# Patient Record
Sex: FEMALE | Race: WHITE | Employment: PART TIME | URBAN - METROPOLITAN AREA
[De-identification: names, ages, dates, MRNs, and addresses within clinical notes are randomized per-mention and may not be internally consistent; named-entity substitution may affect disease eponyms.]

---

## 2020-07-03 ENCOUNTER — HOSPITAL ENCOUNTER (EMERGENCY)
Facility: CLINIC | Age: 55
Discharge: HOME | End: 2020-07-03
Attending: PHYSICIAN ASSISTANT | Admitting: PHYSICIAN ASSISTANT
Payer: COMMERCIAL

## 2020-07-03 ENCOUNTER — APPOINTMENT (OUTPATIENT)
Dept: GENERAL RADIOLOGY | Facility: CLINIC | Age: 55
End: 2020-07-03
Attending: PHYSICIAN ASSISTANT
Payer: COMMERCIAL

## 2020-07-03 VITALS
OXYGEN SATURATION: 100 % | SYSTOLIC BLOOD PRESSURE: 145 MMHG | DIASTOLIC BLOOD PRESSURE: 89 MMHG | WEIGHT: 145 LBS | HEART RATE: 66 BPM | RESPIRATION RATE: 16 BRPM | TEMPERATURE: 98 F

## 2020-07-03 DIAGNOSIS — R07.9 CHEST PAIN: ICD-10-CM

## 2020-07-03 LAB
ANION GAP SERPL CALCULATED.3IONS-SCNC: 4 MMOL/L (ref 3–14)
BASOPHILS # BLD AUTO: 0.1 10E9/L (ref 0–0.2)
BASOPHILS NFR BLD AUTO: 1.1 %
BUN SERPL-MCNC: 13 MG/DL (ref 7–30)
CALCIUM SERPL-MCNC: 8.8 MG/DL (ref 8.5–10.1)
CHLORIDE SERPL-SCNC: 104 MMOL/L (ref 94–109)
CO2 SERPL-SCNC: 28 MMOL/L (ref 20–32)
CREAT SERPL-MCNC: 0.7 MG/DL (ref 0.52–1.04)
D DIMER PPP FEU-MCNC: <0.3 UG/ML FEU (ref 0–0.5)
DIFFERENTIAL METHOD BLD: NORMAL
EOSINOPHIL # BLD AUTO: 0.3 10E9/L (ref 0–0.7)
EOSINOPHIL NFR BLD AUTO: 5 %
ERYTHROCYTE [DISTWIDTH] IN BLOOD BY AUTOMATED COUNT: 12.5 % (ref 10–15)
GFR SERPL CREATININE-BSD FRML MDRD: >90 ML/MIN/{1.73_M2}
GLUCOSE SERPL-MCNC: 92 MG/DL (ref 70–99)
HCT VFR BLD AUTO: 42.2 % (ref 35–47)
HGB BLD-MCNC: 13.7 G/DL (ref 11.7–15.7)
IMM GRANULOCYTES # BLD: 0 10E9/L (ref 0–0.4)
IMM GRANULOCYTES NFR BLD: 0.2 %
LYMPHOCYTES # BLD AUTO: 1.3 10E9/L (ref 0.8–5.3)
LYMPHOCYTES NFR BLD AUTO: 25 %
MCH RBC QN AUTO: 30.4 PG (ref 26.5–33)
MCHC RBC AUTO-ENTMCNC: 32.5 G/DL (ref 31.5–36.5)
MCV RBC AUTO: 94 FL (ref 78–100)
MONOCYTES # BLD AUTO: 0.5 10E9/L (ref 0–1.3)
MONOCYTES NFR BLD AUTO: 9.5 %
NEUTROPHILS # BLD AUTO: 3.1 10E9/L (ref 1.6–8.3)
NEUTROPHILS NFR BLD AUTO: 59.2 %
NRBC # BLD AUTO: 0 10*3/UL
NRBC BLD AUTO-RTO: 0 /100
PLATELET # BLD AUTO: 311 10E9/L (ref 150–450)
POTASSIUM SERPL-SCNC: 4.1 MMOL/L (ref 3.4–5.3)
RBC # BLD AUTO: 4.51 10E12/L (ref 3.8–5.2)
SODIUM SERPL-SCNC: 136 MMOL/L (ref 133–144)
TROPONIN I SERPL-MCNC: <0.015 UG/L (ref 0–0.04)
TROPONIN I SERPL-MCNC: <0.015 UG/L (ref 0–0.04)
WBC # BLD AUTO: 5.2 10E9/L (ref 4–11)

## 2020-07-03 PROCEDURE — 84484 ASSAY OF TROPONIN QUANT: CPT | Performed by: PHYSICIAN ASSISTANT

## 2020-07-03 PROCEDURE — 85379 FIBRIN DEGRADATION QUANT: CPT | Performed by: PHYSICIAN ASSISTANT

## 2020-07-03 PROCEDURE — 99285 EMERGENCY DEPT VISIT HI MDM: CPT | Mod: 25

## 2020-07-03 PROCEDURE — 93005 ELECTROCARDIOGRAM TRACING: CPT

## 2020-07-03 PROCEDURE — 71046 X-RAY EXAM CHEST 2 VIEWS: CPT

## 2020-07-03 PROCEDURE — 80048 BASIC METABOLIC PNL TOTAL CA: CPT | Performed by: PHYSICIAN ASSISTANT

## 2020-07-03 PROCEDURE — 85025 COMPLETE CBC W/AUTO DIFF WBC: CPT | Performed by: PHYSICIAN ASSISTANT

## 2020-07-03 ASSESSMENT — ENCOUNTER SYMPTOMS
DIZZINESS: 1
WHEEZING: 0
LIGHT-HEADEDNESS: 1
FEVER: 0
NAUSEA: 1
COUGH: 0
CHILLS: 1
VOMITING: 0

## 2020-07-03 NOTE — ED TRIAGE NOTES
Chest pain with some lightheadedness for 1.5 hours.  ABCs intact.  Patient is alert and oriented x3.

## 2020-07-03 NOTE — ED PROVIDER NOTES
History     Chief Complaint:  Chest Pain    HPI   Trena Yi is a 54 year old female who presents with centralized chest pain. She reports that her pain began approximately 1.5 hours prior to arrival. She does not report any abnormal activity prior to the onset of her chest pain and pressure. She indicates that her pain is constant but she has episodes of intense pain which last for 1 minute before subsiding. She rates her pain as a 2/10, which is not changed by position or exertion. During her episodes, she reports feeling dizzy, lightheaded, and has mild nausea and chills. She denies vomiting, cough, fever, hemoptysis, and wheezing.  She has no abdominal pain or difficulty breathing. She denies leg swelling or a feeling of fluid on her legs. Of note, she reports no personal history of heart disease, although her mother had a MI. She denies any other complaints.    Allergies:  No known drug allergies    Medications:    Esterified estrogens     Past Medical History:    Unspecified hearing loss    Past Surgical History:    C section  Cyst removal    Family History:    History reviewed. No pertinent family history.     Social History:  Smoking status: never  Alcohol use: yes, 4 times a week  Marital Status:   [2]     Review of Systems   Constitutional: Positive for chills. Negative for fever.   Respiratory: Negative for cough and wheezing.    Cardiovascular: Positive for chest pain. Negative for leg swelling.   Gastrointestinal: Positive for nausea. Negative for vomiting.   Neurological: Positive for dizziness and light-headedness.   All other systems reviewed and are negative.      Physical Exam     Patient Vitals for the past 24 hrs:   BP Temp Pulse Heart Rate Resp SpO2 Weight   07/03/20 1500 -- -- -- -- -- 100 % --   07/03/20 1445 (!) 145/89 -- -- -- -- -- --   07/03/20 1315 128/86 -- 66 64 -- 99 % --   07/03/20 1300 132/83 -- 62 60 -- 100 % --   07/03/20 1245 133/87 -- 62 70 -- 92 % --   07/03/20 1230  138/86 -- 66 66 -- 100 % --   07/03/20 1215 (!) 148/89 -- -- -- -- -- --   07/03/20 1203 (!) 152/89 98  F (36.7  C) 58 -- 16 98 % 65.8 kg (145 lb)     Physical Exam  Vitals signs and nursing note reviewed.   Constitutional:       General: She is not in acute distress.     Appearance: She is not diaphoretic.   HENT:      Head: Normocephalic and atraumatic.      Mouth/Throat:      Pharynx: No oropharyngeal exudate.   Eyes:      General: No scleral icterus.     Extraocular Movements: Extraocular movements intact.   Cardiovascular:      Rate and Rhythm: Normal rate and regular rhythm.      Pulses: Normal pulses.      Heart sounds: Normal heart sounds.   Pulmonary:      Effort: Pulmonary effort is normal. No respiratory distress.      Breath sounds: Normal breath sounds.   Abdominal:      Palpations: Abdomen is soft.      Tenderness: There is no abdominal tenderness.   Musculoskeletal:         General: No tenderness.   Skin:     General: Skin is warm.      Findings: No rash.   Neurological:      Mental Status: She is alert.       Emergency Department Course   ECG:  ECG taken at 1217, ECG read at 1221  Normal sinus rhythm  Normal ECG  Rate 65 bpm. TN interval 136 ms. QRS duration 94 ms. QT/QTc 402/418 ms. P-R-T axes 80 78 50.    Imaging:  Radiographic findings were communicated with the patient who voiced understanding of the findings.  XR Chest PA & LAT  IMPRESSION: No radiographic evidence of acute chest abnormality  Reading per radiology    Laboratory:  Laboratory findings were communicated with the patient who voiced understanding of the findings.    CBC: WNL. (WBC 5.2, HGB 13.7, )   BMP: Glucose 92, o/w WNL (Creatinine: 0.70)    D-dimer: <0.3  Troponin (Collected 1237): <0.015  Troponin (Collected 1448): <0.015    Emergency Department Course:  Past medical records, nursing notes, and vitals reviewed.  1209: I performed an exam of the patient and obtained history, as documented above.     Blood drawn. This was  sent to the lab for further testing, results above.    An ECG was obtained in the emergency department, findings above.     The patient was sent for a chest XR while in the emergency department, findings above.    1334: I rechecked the patient. Explained findings to patient.    1422: I rechecked the patient. Findings and plan explained to the Patient. Patient discharged home with instructions regarding supportive care, medications, and reasons to return. The importance of close follow-up was reviewed.       Impression & Plan      Medical Decision Making:  She presents for evaluation of chest pain and near syncope. Here she appears well and hemodynamically stable. Her EKG demonstrates no ischemic changes or dysrhythmia at this time. Given the timing of her symptoms serial troponins appear warranted to adequately evaluate for NSTEMI. These were without elevation ruling this down. She is HEART score low risk and appears candidate for outpatient PCP follow up. CAD/ACS is clinically unlikely at this time.   She is presently on estrogen therapy prompting D-dimer to risk stratify for pulmonary embolism. This was without elevation and CT imaging does not appear warranted for further evaluation at this time.   Her CXR demonstrates no findings of pneumothorax, widened mediastinum and clinically not most c/w aortic dissection . She has no muffled heart sounds, no cardiomegaly on imaging, and unlikely to be cardiac tamponade.   She appears a candidate for outpatient care.       Diagnosis:    ICD-10-CM    1. Chest pain  R07.9 Troponin I       Disposition:  discharged to home    Discharge Medications:  New Prescriptions    No medications on file     Scribe Disclosure:  I, Adelaide Ramirez, am serving as a scribe at 12:08 PM on 7/3/2020 to document services personally performed by Nish Cody based on my observations and the provider's statements to me.     Adelaide Ramirez  7/3/2020   [facility]  EMERGENCY DEPARTMENT       Nish Cody PA-C  07/03/20 1960

## 2020-07-03 NOTE — ED AVS SNAPSHOT
[facility] Emergency Department  [address]  [facility] [address]  Phone:  [phone]  Fax:  [phone]                                    Trena Yi   MRN: [id number]    Department:  [facility] Emergency Department   Date of Visit:  7/3/2020           After Visit Summary Signature Page    I have received my discharge instructions, and my questions have been answered. I have discussed any challenges I see with this plan with the nurse or doctor.    ..........................................................................................................................................  Patient/Patient Representative Signature      ..........................................................................................................................................  Patient Representative Print Name and Relationship to Patient    ..................................................               ................................................  Date                                   Time    ..........................................................................................................................................  Reviewed by Signature/Title    ...................................................              ..............................................  Date                                               Time          22EPIC Rev 08/18

## 2020-07-04 LAB — INTERPRETATION ECG - MUSE: NORMAL

## 2020-12-06 ENCOUNTER — HEALTH MAINTENANCE LETTER (OUTPATIENT)
Age: 55
End: 2020-12-06

## 2021-09-25 ENCOUNTER — HEALTH MAINTENANCE LETTER (OUTPATIENT)
Age: 56
End: 2021-09-25

## 2022-01-15 ENCOUNTER — HEALTH MAINTENANCE LETTER (OUTPATIENT)
Age: 57
End: 2022-01-15

## 2022-08-26 ENCOUNTER — OFFICE VISIT (OUTPATIENT)
Dept: PLASTIC SURGERY | Facility: CLINIC | Age: 57
End: 2022-08-26

## 2022-08-26 DIAGNOSIS — Z41.1 ELECTIVE PROCEDURE FOR UNACCEPTABLE COSMETIC APPEARANCE: Primary | ICD-10-CM

## 2022-08-26 NOTE — LETTER
8/26/2022       RE: Trena Yi  [address]  [facility] [address]     Dear Colleague,    Thank you for referring your patient, Trena Yi, to the [facility]. Please see a copy of my visit note below.    Facial Plastic and Reconstructive Surgery Consultation    Referring Provider:   Referred Self, MD  No address on file    HPI:   I had the pleasure of seeing Trena Yi today in clinic for consultation for facial rejuvenation.  Trena Yi is a 56 year old female interested in improving the posture of her facial tissue in addition to her jaw and neck line.     She has had surgery with no concerns in the past. She did have to have a colonoscopy aborted due to intolerance of sedation with an airway concern from what I can deduce. She then had this done from what it sounds like with general anesthesia.     Review Of Systems  ROS: 10 point ROS neg other than the symptoms noted above in the HPI.    There is no problem list on file for this patient.    No past surgical history on file.  No current outpatient medications on file.     Patient has no allergy information on record.  Social History     Socioeconomic History     Marital status:      Spouse name: Not on file     Number of children: Not on file     Years of education: Not on file     Highest education level: Not on file   Occupational History     Not on file   Tobacco Use     Smoking status: Not on file     Smokeless tobacco: Not on file   Substance and Sexual Activity     Alcohol use: Not on file     Drug use: Not on file     Sexual activity: Not on file   Other Topics Concern     Not on file   Social History Narrative     Not on file     Social Determinants of Health     Financial Resource Strain: Not on file   Food Insecurity: Not on file   Transportation Needs: Not on file   Physical Activity: Not on file   Stress: Not on file   Social Connections: Not on file    Intimate Partner Violence: Not on file   Housing Stability: Not on file     No family history on file.    PE:  Alert and Oriented, Answering Questions Appropriately  Atraumatic, Normocephalic, Face Symmetric  Skin: Rojas 2  Facial Nerve Intact and facial movement symmetric  Jowls bilaterally with submental laxity  EOM's, PEERL  Nasal Exam: No external Deformity, no mucopurulence or polyps, no masses  Chin: Normal   Neuro/Psych: CN's 2-12 intact, Moves all extremities, ambulation in intact, positive affect, no notable muscle weakness        IMPRESSION:    Rhytidectomy  Facial Filler    PLAN:    We discussed face lift, surgery and possible complications.     Photodocumentation was obtained.               Again, thank you for allowing me to participate in the care of your patient.      Sincerely,    Marlene Godfrey MD

## 2022-08-26 NOTE — PROGRESS NOTES
Facial Plastic and Reconstructive Surgery Consultation    Referring Provider:   Referred Self, MD  No address on file    HPI:   I had the pleasure of seeing Trena Yi today in clinic for consultation for facial rejuvenation.  Trena Yi is a 56 year old female interested in improving the posture of her facial tissue in addition to her jaw and neck line.     She has had surgery with no concerns in the past. She did have to have a colonoscopy aborted due to intolerance of sedation with an airway concern from what I can deduce. She then had this done from what it sounds like with general anesthesia.     Review Of Systems  ROS: 10 point ROS neg other than the symptoms noted above in the HPI.    There is no problem list on file for this patient.    No past surgical history on file.  No current outpatient medications on file.     Patient has no allergy information on record.  Social History     Socioeconomic History     Marital status:      Spouse name: Not on file     Number of children: Not on file     Years of education: Not on file     Highest education level: Not on file   Occupational History     Not on file   Tobacco Use     Smoking status: Not on file     Smokeless tobacco: Not on file   Substance and Sexual Activity     Alcohol use: Not on file     Drug use: Not on file     Sexual activity: Not on file   Other Topics Concern     Not on file   Social History Narrative     Not on file     Social Determinants of Health     Financial Resource Strain: Not on file   Food Insecurity: Not on file   Transportation Needs: Not on file   Physical Activity: Not on file   Stress: Not on file   Social Connections: Not on file   Intimate Partner Violence: Not on file   Housing Stability: Not on file     No family history on file.    PE:  Alert and Oriented, Answering Questions Appropriately  Atraumatic, Normocephalic, Face Symmetric  Skin: Rojas 2  Facial Nerve Intact and facial movement symmetric  Jowls  bilaterally with submental laxity  EOM's, PEERL  Nasal Exam: No external Deformity, no mucopurulence or polyps, no masses  Chin: Normal   Neuro/Psych: CN's 2-12 intact, Moves all extremities, ambulation in intact, positive affect, no notable muscle weakness        IMPRESSION:    Rhytidectomy  Facial Filler    PLAN:    We discussed face lift, surgery and possible complications.     Photodocumentation was obtained.

## 2022-08-26 NOTE — LETTER
August 26, 2022  Re: Trena Yi  1965    Dear Dr. Chan,    Thank you so much for referring Trena Yi to the [facility]. I had the pleasure of visiting with Trena today.     Attached you will find a copy of my note. Please feel free to reach out to me with any questions, [phone].     Facial Plastic and Reconstructive Surgery Consultation    Referring Provider:   Referred Self, MD  No address on file    HPI:   I had the pleasure of seeing Trena Yi today in clinic for consultation for facial rejuvenation.  Trena Yi is a 56 year old female interested in improving the posture of her facial tissue in addition to her jaw and neck line.     She has had surgery with no concerns in the past. She did have to have a colonoscopy aborted due to intolerance of sedation with an airway concern from what I can deduce. She then had this done from what it sounds like with general anesthesia.     Review Of Systems  ROS: 10 point ROS neg other than the symptoms noted above in the HPI.    There is no problem list on file for this patient.    No past surgical history on file.  No current outpatient medications on file.     Patient has no allergy information on record.  Social History     Socioeconomic History     Marital status:      Spouse name: Not on file     Number of children: Not on file     Years of education: Not on file     Highest education level: Not on file   Occupational History     Not on file   Tobacco Use     Smoking status: Not on file     Smokeless tobacco: Not on file   Substance and Sexual Activity     Alcohol use: Not on file     Drug use: Not on file     Sexual activity: Not on file   Other Topics Concern     Not on file   Social History Narrative     Not on file     Social Determinants of Health     Financial Resource Strain: Not on file   Food Insecurity: Not on file   Transportation Needs: Not on file   Physical Activity: Not on file   Stress: Not on file   Social  Connections: Not on file   Intimate Partner Violence: Not on file   Housing Stability: Not on file     No family history on file.    PE:  Alert and Oriented, Answering Questions Appropriately  Atraumatic, Normocephalic, Face Symmetric  Skin: Rojas 2  Facial Nerve Intact and facial movement symmetric  Jowls bilaterally with submental laxity  EOM's, PEERL  Nasal Exam: No external Deformity, no mucopurulence or polyps, no masses  Chin: Normal   Neuro/Psych: CN's 2-12 intact, Moves all extremities, ambulation in intact, positive affect, no notable muscle weakness        IMPRESSION:    Rhytidectomy  Facial Filler    PLAN:    We discussed face lift, surgery and possible complications.     Photodocumentation was obtained.     Your trust in our practice and care is much appreciated.    Sincerely,  Marlene Godfrey MD

## 2022-08-30 RX ORDER — LEVOTHYROXINE SODIUM 50 UG/1
50 TABLET ORAL DAILY
COMMUNITY

## 2022-08-30 NOTE — NURSING NOTE
2D/3D photodocumentation obtained.    Gave pt a cosmetic quote for Facelift (see Media tab).    Discussed quote in great detail with pt, including the fact that the Anesthesia and Facility fees are an estimate based on time and may be subject to change.    Pt given education materials on what to expect post-op, as well as a list of medications to avoid prior to surgery.    Pt will call when/if ready to schedule.    Mariela Mares RN  8/30/2022 12:56 PM

## 2023-01-07 ENCOUNTER — HEALTH MAINTENANCE LETTER (OUTPATIENT)
Age: 58
End: 2023-01-07

## 2024-02-10 ENCOUNTER — HEALTH MAINTENANCE LETTER (OUTPATIENT)
Age: 59
End: 2024-02-10

## 2024-05-07 ENCOUNTER — OFFICE VISIT (OUTPATIENT)
Dept: PLASTIC SURGERY | Facility: CLINIC | Age: 59
End: 2024-05-07
Payer: COMMERCIAL

## 2024-05-07 DIAGNOSIS — Z41.1 ENCOUNTER FOR COSMETIC PROCEDURE: Primary | ICD-10-CM

## 2024-05-07 NOTE — PROGRESS NOTES
Facial Plastic and Reconstructive Surgery Cosmetic Consultation  05/07/24       HPI:   I had the pleasure of seeing Trena Yi today in clinic for consultation for surgical facial rejuvenation.    Trena Yi is a 58 year old female.  She reports that she is most bothered by the descent of her tissues over the years.  Her  is 10 years younger than her and she does not like how old she is looking.  She is never done any fillers or thread lifts, no lasers.  She has done Botox.  She has a history of a lower lid blepharoplasty in 2018.    Past medical history: None  Medications: Levothyroxine  Surgical history: Lower lid blepharoplasty and C-sections.  She does have a history of significant postoperative nausea and vomiting.  She also notes that a colonoscopy had to be aborted because she was coughing and they were worried about her airway.  Allergies: No known drug allergies  Smoking: Never smoker    PE:  Alert and Oriented, Answering Questions Appropriately  Atraumatic, Normocephalic, Face Symmetric  Skin: Rojas 2  Facial Nerve Intact and facial movement symmetric  She has thin skin.  She has volume loss throughout.  She has descent of her mid facial tissues with early jowling.  She has some deepening of her nasolabial folds.  She has blunting of her cervical mental angle.  She has a slight amount of submental fullness and platysmal banding.            IMPRESSION/PLAN: Trena Yi is a 58 year old female here today in consult for surgical facial rejuvenation.  We discussed a deep plane lower face and neck lift with platysmaplasty and minimal submental liposuction.  We also discussed full face fat grafting to add volume to her midface and nasolabial folds.  She is a great candidate for both of these procedures.Risks and benefits of the procedure were discussed, including but not limited to motor/sensory nerve damage (temporary and permanent), scarring, hematoma, irregularities of skin and  underlying soft tissue, infection, asymmetry, and the need for additional procedures.     Photodocumentation was obtained.

## 2024-10-09 DIAGNOSIS — Z98.890 POSTOPERATIVE STATE: Primary | ICD-10-CM

## 2024-10-09 RX ORDER — MINERAL OIL/HYDROPHIL PETROLAT
OINTMENT (GRAM) TOPICAL
Qty: 50 G | Refills: 11 | Status: SHIPPED | OUTPATIENT
Start: 2024-10-09

## 2024-10-09 RX ORDER — OXYCODONE HYDROCHLORIDE 5 MG/1
5 TABLET ORAL EVERY 6 HOURS PRN
Qty: 8 TABLET | Refills: 0 | Status: SHIPPED | OUTPATIENT
Start: 2024-10-09 | End: 2024-10-12

## 2024-10-09 RX ORDER — ONDANSETRON 4 MG/1
4 TABLET, ORALLY DISINTEGRATING ORAL EVERY 6 HOURS PRN
Qty: 12 TABLET | Refills: 0 | Status: SHIPPED | OUTPATIENT
Start: 2024-10-09

## 2024-10-10 ENCOUNTER — TRANSFERRED RECORDS (OUTPATIENT)
Dept: HEALTH INFORMATION MANAGEMENT | Facility: CLINIC | Age: 59
End: 2024-10-10

## 2024-10-11 ENCOUNTER — OFFICE VISIT (OUTPATIENT)
Dept: PLASTIC SURGERY | Facility: CLINIC | Age: 59
End: 2024-10-11
Payer: COMMERCIAL

## 2024-10-11 DIAGNOSIS — Z98.890 POSTOPERATIVE STATE: Primary | ICD-10-CM

## 2024-10-11 NOTE — PROGRESS NOTES
Pt comes in POD #1 s/p Face/Neck Lift, Platsmaplasty, Submental Liposuction, & Full Face Fat Grafting     Head dressing removed. Bilateral head JPs had minimal sanguineous output over the past 24 hrs.  Bilateral JPs removed.     All incisions are well approximated.  Mild face and neck edema present with minimal bruising.     Wound cares explained and demonstrated for pt.  Cleansed incision with H202 and applied Aquaphor.  Telfa placed over incisions.  4x4 gauze placed over telfa and donned jaw bra.     Pt given extra wound care supplies for home.  We discussed s/s of hematoma and when to notify the clinic.     Pt verbalized understanding.     Follow-up in one week for suture/staple removal, sooner if issue.     Arlet Allan RN  10/11/24 3:17 PM

## 2024-10-16 ENCOUNTER — OFFICE VISIT (OUTPATIENT)
Dept: PLASTIC SURGERY | Facility: CLINIC | Age: 59
End: 2024-10-16
Payer: COMMERCIAL

## 2024-10-16 DIAGNOSIS — Z98.890 POSTOPERATIVE STATE: Primary | ICD-10-CM

## 2024-10-16 NOTE — PROGRESS NOTES
Pt came in to clinic today POD6 s/p Face/Neck Lift, Platsmaplasty, Submental Liposuction, & Full Face Fat Grafting.    Sutures and staples were removed. Incisions appear well approximated and appropriately red and swollen.     Incisions cleaned and Aquaphor applied. Pt instructed to keep a sheen of Aquaphor over incisions at all times. She no longer needs to wear the jaw braw unless it is comfortable for her.     Pt will follow-up with Dr. Humphrey in one month, sooner if issues.     Arlet Allan RN  10/16/24 2:27 PM

## 2024-11-20 NOTE — PROGRESS NOTES
Facial Plastic and Reconstructive Surgery      Trena Yi is about 1 month s/p deep plane lower face and neck lift, submental liposuction, platysmaplasty, and autologous fat grafting from abdomen to face.     Today, she reports she is doing well and happy with the results thus far. She has no concerns.     On exam, she looks amazing and her jawline and neck look incredible. Her incisions are erythematous but healing nicely.     A/p: She is now one month out from surgery. She is looking great.   We discussed continued wound care including silicone scar gel.   I would like to see her back in 3-4 months, sooner if there are issues.     Seble Humphrey MD

## 2024-11-21 ENCOUNTER — OFFICE VISIT (OUTPATIENT)
Dept: PLASTIC SURGERY | Facility: CLINIC | Age: 59
End: 2024-11-21
Payer: COMMERCIAL

## 2024-11-21 DIAGNOSIS — Z41.1 ENCOUNTER FOR COSMETIC PROCEDURE: Primary | ICD-10-CM

## 2025-01-05 ENCOUNTER — HEALTH MAINTENANCE LETTER (OUTPATIENT)
Age: 60
End: 2025-01-05

## 2025-03-04 ENCOUNTER — OFFICE VISIT (OUTPATIENT)
Dept: PLASTIC SURGERY | Facility: CLINIC | Age: 60
End: 2025-03-04

## 2025-03-04 DIAGNOSIS — Z41.1 ENCOUNTER FOR COSMETIC PROCEDURE: Primary | ICD-10-CM

## 2025-03-04 NOTE — PROGRESS NOTES
Facial Plastic and Reconstructive Surgery        Trena Yi is about 5 months s/p deep plane lower face and neck lift, submental liposuction, platysmaplasty, and autologous fat grafting from abdomen to face on 10/10/25.      Today, she reports she is doing well and happy with results. No concerns. Sometimes using silicone scar gel. Numbness improving. The feeling of tightness is also improving.      On exam, she looks amazing. Her scars are fading nicely.      A/p: She is now 5 months out from surgery. She is looking great.   We discussed continued wound care including silicone scar gel.   I would like to see her back in 3-4 months, sooner if there are issues.      Seble Humphrey MD

## 2025-07-21 NOTE — PROGRESS NOTES
Facial Plastic and Reconstructive Surgery        Trena Yi is about 9 months s/p deep plane lower face and neck lift, submental liposuction, platysmaplasty, and autologous fat grafting from abdomen to face on 10/10/25.      Today, she reports she is thrilled and has no concerns. Happy with how things look.      On exam, she looks amazing. Her scars are fading nicely and settled out very well.      A/p: She is now 9 months out from surgery. She is looking great.   We discussed continued wound care including silicone scar gel.   She can follow up as needed and certainly if any concerns arise. We are both thrilled with how things turned out.      Seble Humphrey MD

## 2025-07-22 ENCOUNTER — OFFICE VISIT (OUTPATIENT)
Dept: PLASTIC SURGERY | Facility: CLINIC | Age: 60
End: 2025-07-22
Payer: COMMERCIAL

## 2025-07-22 DIAGNOSIS — Z41.1 ENCOUNTER FOR COSMETIC PROCEDURE: Primary | ICD-10-CM
